# Patient Record
Sex: FEMALE | URBAN - METROPOLITAN AREA
[De-identification: names, ages, dates, MRNs, and addresses within clinical notes are randomized per-mention and may not be internally consistent; named-entity substitution may affect disease eponyms.]

---

## 2023-03-31 ENCOUNTER — TELEPHONE ENCOUNTER (OUTPATIENT)
Dept: URBAN - METROPOLITAN AREA CLINIC 107 | Facility: CLINIC | Age: 68
End: 2023-03-31

## 2023-06-08 ENCOUNTER — OFFICE VISIT (OUTPATIENT)
Dept: URBAN - METROPOLITAN AREA CLINIC 113 | Facility: CLINIC | Age: 68
End: 2023-06-08

## 2023-09-21 ENCOUNTER — OFFICE VISIT (OUTPATIENT)
Dept: URBAN - METROPOLITAN AREA CLINIC 113 | Facility: CLINIC | Age: 68
End: 2023-09-21

## 2023-11-30 ENCOUNTER — OFFICE VISIT (OUTPATIENT)
Dept: URBAN - METROPOLITAN AREA CLINIC 113 | Facility: CLINIC | Age: 68
End: 2023-11-30
Payer: MEDICARE

## 2023-11-30 ENCOUNTER — DASHBOARD ENCOUNTERS (OUTPATIENT)
Age: 68
End: 2023-11-30

## 2023-11-30 VITALS
DIASTOLIC BLOOD PRESSURE: 75 MMHG | HEART RATE: 70 BPM | HEIGHT: 60 IN | RESPIRATION RATE: 18 BRPM | BODY MASS INDEX: 36.12 KG/M2 | TEMPERATURE: 97.5 F | WEIGHT: 184 LBS | SYSTOLIC BLOOD PRESSURE: 108 MMHG

## 2023-11-30 DIAGNOSIS — K76.82 HEPATIC ENCEPHALOPATHY: ICD-10-CM

## 2023-11-30 DIAGNOSIS — Z86.19 HX OF HEPATITIS C: ICD-10-CM

## 2023-11-30 DIAGNOSIS — I85.10 SECONDARY ESOPHAGEAL VARICES WITHOUT BLEEDING: ICD-10-CM

## 2023-11-30 DIAGNOSIS — K70.30 ALCOHOLIC CIRRHOSIS OF LIVER WITHOUT ASCITES: ICD-10-CM

## 2023-11-30 PROBLEM — 420054005: Status: ACTIVE | Noted: 2023-11-30

## 2023-11-30 PROBLEM — 14223005: Status: ACTIVE | Noted: 2023-11-30

## 2023-11-30 PROCEDURE — 99204 OFFICE O/P NEW MOD 45 MIN: CPT | Performed by: INTERNAL MEDICINE

## 2023-11-30 RX ORDER — RIFAXIMIN 200 MG/1
2 TABLETS TABLET ORAL THREE TIMES A DAY
OUTPATIENT

## 2023-11-30 RX ORDER — ESCITALOPRAM OXALATE 20 MG/1
1 TABLET TABLET ORAL ONCE A DAY
Status: ACTIVE | COMMUNITY

## 2023-11-30 RX ORDER — LOSARTAN POTASSIUM 25 MG/1
1 TABLET TABLET, FILM COATED ORAL ONCE A DAY
Status: ACTIVE | COMMUNITY

## 2023-11-30 RX ORDER — HYDROCHLOROTHIAZIDE 25 MG/1
1 TABLET IN THE MORNING TABLET ORAL ONCE A DAY
Status: ACTIVE | COMMUNITY

## 2023-11-30 RX ORDER — LACTULOSE 10 G/15ML
15 ML AS NEEDED SOLUTION ORAL ONCE A DAY
Status: ACTIVE | COMMUNITY

## 2023-11-30 RX ORDER — LACTULOSE 10 G/15ML
15 ML AS NEEDED SOLUTION ORAL ONCE A DAY
OUTPATIENT

## 2023-11-30 RX ORDER — CLONAZEPAM 1 MG/1
1 TABLET TABLET ORAL ONCE A DAY
Status: ACTIVE | COMMUNITY

## 2023-11-30 RX ORDER — NADOLOL 20 MG/1
2 TABLETS TABLET ORAL ONCE A DAY
OUTPATIENT

## 2023-11-30 RX ORDER — RIFAXIMIN 200 MG/1
2 TABLETS TABLET ORAL THREE TIMES A DAY
Status: ACTIVE | COMMUNITY

## 2023-11-30 RX ORDER — GABAPENTIN 100 MG/1
1 CAPSULE CAPSULE ORAL ONCE A DAY
Status: ACTIVE | COMMUNITY

## 2023-11-30 RX ORDER — NADOLOL 20 MG/1
2 TABLETS TABLET ORAL ONCE A DAY
Status: ACTIVE | COMMUNITY

## 2023-11-30 NOTE — HPI-TODAY'S VISIT:
Ms. Cunha is a 68-year-old female referred from Dr Steph Urbina for evaluation of varices.  Her PCP is Renetta Cruz.   She has been followed by Dr. Urbina for a few years and previously followed by Dr. 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  She has undergone treatment for hepatitis C x2, most recently 7 years ago.  She reportedly cleared the virus.  She was previously a heavy drinker and stopped 7 years ago although she did have a few beers a few weeks ago.  She was previously on Antabuse and did undergo rehab.  She was seen at [facility] by Dr. Ching 4 years ago for liver transplant evaluation when she was hospitalized for encephalopathy and vitamin B deficiency.  She was told she was not a liver transplant candidate at the time.  She has a referral back to [facility] in the near future.  She was told by Dr. Urbina to follow-up with us to discussed possible gastric variceal treatments.  She has never had any prior variceal bleeds.  She is currently nadolol daily.  She is on Xifaxan and lactulose for hepatic encephalopathy.  She denies ascites, abdominal pain, nausea, vomiting, jaundice or blood in the stool.  Negative family history for chronic liver disease or GI cancers.  She recently had a few beers and was a prior heavy alcohol user and stopped 7 years ago.  No smoking or current illicit drug use.  Labs 11/27/2023 WBC 5.8, hemoglobin 15.1, MCV 97, platelet 108; BUN 9, creatinine 0.79, glucose 114, AST 21, ALT 12, alk phos 119, T. bili 1.1, albumin 4.5Her last EGD with Dr. Urbina 9/2022 revealed small esophageal varices, esophageal scars from prior banding, duodenitis.  It was recommend repeat EGD in 6 months.  Prior EGD 2/2022 revealed large esophageal varices status post banding, gastritis and duodenitis.